# Patient Record
Sex: MALE | Race: WHITE | NOT HISPANIC OR LATINO | URBAN - METROPOLITAN AREA
[De-identification: names, ages, dates, MRNs, and addresses within clinical notes are randomized per-mention and may not be internally consistent; named-entity substitution may affect disease eponyms.]

---

## 2023-04-06 ENCOUNTER — TELEPHONE (OUTPATIENT)
Dept: PRIMARY CARE | Facility: CLINIC | Age: 83
End: 2023-04-06

## 2023-04-21 ENCOUNTER — TELEMEDICINE (OUTPATIENT)
Dept: PRIMARY CARE | Facility: CLINIC | Age: 83
End: 2023-04-21
Payer: MEDICARE

## 2023-04-21 DIAGNOSIS — C34.11: Primary | ICD-10-CM

## 2023-04-21 PROBLEM — H35.30 MACULAR DEGENERATION: Status: ACTIVE | Noted: 2023-04-21

## 2023-04-21 PROBLEM — I10 HTN (HYPERTENSION): Status: ACTIVE | Noted: 2023-04-21

## 2023-04-21 PROBLEM — I10 BENIGN ESSENTIAL HYPERTENSION: Status: ACTIVE | Noted: 2022-05-26

## 2023-04-21 PROBLEM — E78.5 HYPERLIPIDEMIA: Status: ACTIVE | Noted: 2022-05-26

## 2023-04-21 PROBLEM — R29.6 RECURRENT FALLS: Status: ACTIVE | Noted: 2023-04-21

## 2023-04-21 PROBLEM — I25.2 OLD MYOCARDIAL INFARCTION: Status: ACTIVE | Noted: 2022-05-26

## 2023-04-21 PROBLEM — N40.0 BPH (BENIGN PROSTATIC HYPERPLASIA): Status: ACTIVE | Noted: 2023-04-21

## 2023-04-21 PROBLEM — I25.10 ATHEROSCLEROTIC HEART DISEASE OF NATIVE CORONARY ARTERY WITHOUT ANGINA PECTORIS: Status: ACTIVE | Noted: 2022-05-26

## 2023-04-21 PROBLEM — I48.0 PAROXYSMAL ATRIAL FIBRILLATION (MULTI): Status: ACTIVE | Noted: 2022-05-26

## 2023-04-21 PROBLEM — E55.9 VITAMIN D DEFICIENCY: Status: ACTIVE | Noted: 2022-05-26

## 2023-04-21 PROBLEM — E11.9 TYPE 2 DIABETES MELLITUS (MULTI): Status: ACTIVE | Noted: 2022-05-26

## 2023-04-21 PROCEDURE — 99212 OFFICE O/P EST SF 10 MIN: CPT | Performed by: FAMILY MEDICINE

## 2023-04-21 RX ORDER — DONEPEZIL HYDROCHLORIDE 10 MG/1
10 TABLET, FILM COATED ORAL
COMMUNITY
Start: 2015-11-02

## 2023-04-21 RX ORDER — ASPIRIN 81 MG/1
81 TABLET ORAL
COMMUNITY
Start: 2023-04-13

## 2023-04-21 RX ORDER — SERTRALINE HYDROCHLORIDE 25 MG/1
TABLET, FILM COATED ORAL
COMMUNITY
Start: 2023-04-04

## 2023-04-21 RX ORDER — METFORMIN HYDROCHLORIDE 1000 MG/1
TABLET ORAL 2 TIMES DAILY
COMMUNITY
Start: 2014-07-30

## 2023-04-21 RX ORDER — ONDANSETRON 4 MG/1
TABLET, ORALLY DISINTEGRATING ORAL
COMMUNITY
Start: 2023-03-11

## 2023-04-21 RX ORDER — SIMVASTATIN 40 MG/1
40 TABLET, FILM COATED ORAL
COMMUNITY
Start: 2016-08-04

## 2023-04-21 RX ORDER — CALCIUM CARBONATE/VITAMIN D3 500-10/5ML
1 LIQUID (ML) ORAL DAILY
COMMUNITY

## 2023-04-21 RX ORDER — ERGOCALCIFEROL 1.25 MG/1
50000 CAPSULE ORAL
COMMUNITY
Start: 2023-04-19

## 2023-04-21 RX ORDER — POTASSIUM CHLORIDE 1500 MG/1
TABLET, EXTENDED RELEASE ORAL
COMMUNITY
Start: 2022-07-09

## 2023-04-21 RX ORDER — VITAMIN B COMPLEX
1 TABLET ORAL DAILY
COMMUNITY

## 2023-04-21 RX ORDER — CLOPIDOGREL BISULFATE 75 MG/1
75 TABLET, FILM COATED ORAL
COMMUNITY
Start: 2016-11-07

## 2023-04-21 RX ORDER — SERTRALINE HYDROCHLORIDE 50 MG/1
TABLET, FILM COATED ORAL
COMMUNITY
Start: 2023-04-04

## 2023-04-21 RX ORDER — ATORVASTATIN CALCIUM 20 MG/1
TABLET, FILM COATED ORAL
COMMUNITY
Start: 2023-04-04

## 2023-04-21 RX ORDER — ENALAPRIL MALEATE 5 MG
5 TABLET ORAL
COMMUNITY
Start: 2012-06-22

## 2023-04-21 RX ORDER — EPINEPHRINE 0.22MG
AEROSOL WITH ADAPTER (ML) INHALATION
COMMUNITY

## 2023-04-21 RX ORDER — BLOOD SUGAR DIAGNOSTIC
STRIP MISCELLANEOUS
COMMUNITY
Start: 2014-09-11

## 2023-04-21 RX ORDER — PROCHLORPERAZINE MALEATE 10 MG
TABLET ORAL
COMMUNITY
Start: 2022-11-14

## 2023-04-21 NOTE — PROGRESS NOTES
Subjective   Patient ID: Eric Yanes is a 82 y.o. male who presents for needs a placard.    Assessment/Plan     Problem List Items Addressed This Visit    None       Previous       lab work done in May reviewed  Hemoglobin A1c 5.4 on previous visit will check today  Colonoscopy done in March 2017 did not reveal any acute abnormalities. Colonic biopsies within normal limits. Can have a colonoscopy in 5-10 years.  Recent lab work reviewed no lab work today  DEXA scan - February 2018 within normal limits  Urology referral - was seen by Dr. Lamb for BPH and had a prostate laser surgery done  Patient and his wife received new shingles vaccine  Follow-up with cardiology and surgery for right inguinal hernia   Discussed about diet exercise  Up-to-date with pneumonia vaccine  Up-to-date with Covid vaccine        Follow-up in 6 months  Zostavax obtained 2014  Patient understands and in agreement with plan.    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 following with hematology oncology  Diabetes   coronary artery disease status post 3 stent placement   hypertension   hyperlipidemia   right inguinal hernia status post repair   BPH following up with urology   osteoarthritis status post right total hip replacement  Chronic right shoulder pain with history of fall physical therapy was recommended  Chronic constipation             right upper lobe mass s/p biopsy done on 8/23/2022 showed invasive well-differentiated adenocarcinoma -s/p right upper lobe lobectomy       Taking medications as prescribed  No new signs and symptoms  Received flu vaccine received Covid booster vaccin    No Known Allergies    Current Outpatient Medications    Medication Sig Dispense Refill    aspirin 81 mg EC tablet 1 tablet (81 mg).      atorvastatin (Lipitor) 20 mg tablet       calcium carb/D3/magnesium/zinc (calcium carb-D3-mag cmb11-zinc) [phone] mg-unit-mg-mg tablet Take 1 tablet by mouth once daily.      coenzyme Q-10 100 mg capsule Take by mouth once daily.      donepezil (Aricept) 10 mg tablet 1 tablet (10 mg).      ergocalciferol (Vitamin D-2) 1.25 MG (97164 UT) capsule 50,000 Int'l Units.      magnesium oxide 400 mg magnesium capsule Take 1 capsule (400 mg) by mouth once daily.      metFORMIN (Glucophage) 1,000 mg tablet Take by mouth twice a day.      ondansetron ODT (Zofran-ODT) 4 mg disintegrating tablet       OneTouch Ultra Test strip once daily.      Plavix 75 mg tablet 1 tablet (75 mg).      potassium chloride CR (K-Tab) 20 mEq ER tablet TAKE ONE TABLET BY MOUTH DAILY IN THE MORNING FOR SUPPLEMENT      prochlorperazine (Compazine) 10 mg tablet TAKE ONE TABLET BY MOUTH EVERY SIX HOURS AS NEEDED FOR NAUSEA/vomiting      sertraline (Zoloft) 25 mg tablet       Vasotec 5 mg tablet 1 tablet (5 mg).      sertraline (Zoloft) 50 mg tablet       simvastatin (Zocor) 40 mg tablet 1 tablet (40 mg).       No current facility-administered medications for this visit.       Objective   Visit Vitals  Smoking Status Never     Physical Exam  Constitutional:       General: He is not in acute distress.     Appearance: Normal appearance.   HENT:      Head: Normocephalic and atraumatic.      Nose: Nose normal.   Eyes:      Extraocular Movements: Extraocular movements intact.      Conjunctiva/sclera: Conjunctivae normal.   Cardiovascular:      Rate and Rhythm: Normal rate and regular rhythm.   Pulmonary:      Effort: Pulmonary effort is normal.      Breath sounds: Normal breath sounds.   Skin:     General: Skin is warm.   Neurological:      Mental Status: He is alert and oriented to person, place, and time.   Psychiatric:         Mood and Affect: Mood normal.          Behavior: Behavior normal.   Immunization History   Administered Date(s) Administered    Pfizer Purple Cap SARS-CoV-2 03/22/2021, 04/12/2021, 11/15/2021       Review of Systems    No visits with results within 4 Month(s) from this visit.   Latest known visit with results is:   Legacy Encounter on 11/09/2022   Component Date Value Ref Range Status    Color, Urine 11/09/2022 YELLOW  STRAW,YELLOW Final    Appearance, Urine 11/09/2022 CLEAR  CLEAR Final    Specific Gravity, Urine 11/09/2022 1.016  1.005 - 1.035 Final    pH, Urine 11/09/2022 5.0  5.0 - 8.0 Final    Protein, Urine 11/09/2022 NEGATIVE  NEGATIVE mg/dL Final    Glucose, Urine 11/09/2022 NEGATIVE  NEGATIVE mg/dL Final    Blood, Urine 11/09/2022 NEGATIVE  NEGATIVE Final    Ketones, Urine 11/09/2022 NEGATIVE  NEGATIVE mg/dL Final    Bilirubin, Urine 11/09/2022 NEGATIVE  NEGATIVE Final    Urobilinogen, Urine 11/09/2022 <2.0  0.0 - 1.9 mg/dL Final    Nitrite, Urine 11/09/2022 NEGATIVE  NEGATIVE Final    Leukocyte Esterase, Urine 11/09/2022 NEGATIVE  NEGATIVE Final       Radiology: Reviewed imaging in powerchart.  No results found.    No family history on file.  Social History     Socioeconomic History    Marital status:      Spouse name: None    Number of children: None    Years of education: None    Highest education level: None   Occupational History    None   Tobacco Use    Smoking status: Never    Smokeless tobacco: Never   Vaping Use    Vaping status: None   Substance and Sexual Activity    Alcohol use: Never    Drug use: Never    Sexual activity: None   Other Topics Concern    None   Social History Narrative    None     Social Determinants of Health     Financial Resource Strain: Not on file   Food Insecurity: Not on file   Transportation Needs: Not on file   Physical Activity: Not on file   Stress: Not on file   Social Connections: Not on file   Intimate Partner Violence: Not on file   Housing Stability: Not on file     Past Medical History:    Diagnosis Date    Cervicalgia     Cervical pain    Encounter for screening for malignant neoplasm of colon     Screening for colorectal cancer    Encounter for screening for malignant neoplasm of prostate     Prostate cancer screening encounter, options and risks discussed    Nocturia     Nocturia    Paresthesia of skin     Paresthesias    Personal history of other diseases of male genital organs     History of erectile dysfunction    Personal history of other infectious and parasitic diseases     History of herpes zoster    Personal history of other specified conditions     History of insomnia    Sciatica, unspecified side 12/08/2013    Sciatica    Unspecified astigmatism, unspecified eye     Astigmatism    Unspecified cataract     Early cataracts, bilateral    Ventral hernia without obstruction or gangrene     Ventral hernia     Past Surgical History:   Procedure Laterality Date    BACK SURGERY  09/13/2014    Back Surgery    CERVICAL LAMINECTOMY  02/17/2014    Laminectomy Cervical    EYE SURGERY  02/17/2014    Eye Surgery    HERNIA REPAIR  09/13/2014    Inguinal Hernia Repair    OTHER SURGICAL HISTORY  02/17/2014    Reported Hx Of Hip Replacement - Right Side    TONSILLECTOMY  02/17/2014    Tonsillectomy With Adenoidectomy       Charting was completed using voice recognition technology and may include unintended errors.

## 2023-07-28 ENCOUNTER — HOSPITAL ENCOUNTER (OUTPATIENT)
Dept: DATA CONVERSION | Facility: HOSPITAL | Age: 83
End: 2023-07-28
Attending: INTERNAL MEDICINE | Admitting: INTERNAL MEDICINE
Payer: MEDICARE

## 2023-07-28 DIAGNOSIS — C78.1 SECONDARY MALIGNANT NEOPLASM OF MEDIASTINUM (MULTI): ICD-10-CM

## 2023-07-28 DIAGNOSIS — R59.0 LOCALIZED ENLARGED LYMPH NODES: ICD-10-CM

## 2023-07-28 DIAGNOSIS — I10 ESSENTIAL (PRIMARY) HYPERTENSION: ICD-10-CM

## 2023-07-28 DIAGNOSIS — R91.8 OTHER NONSPECIFIC ABNORMAL FINDING OF LUNG FIELD: ICD-10-CM

## 2023-07-28 DIAGNOSIS — E78.5 HYPERLIPIDEMIA, UNSPECIFIED: ICD-10-CM

## 2023-07-28 DIAGNOSIS — Z85.118 PERSONAL HISTORY OF OTHER MALIGNANT NEOPLASM OF BRONCHUS AND LUNG: ICD-10-CM

## 2023-07-28 DIAGNOSIS — R09.89 OTHER SPECIFIED SYMPTOMS AND SIGNS INVOLVING THE CIRCULATORY AND RESPIRATORY SYSTEMS: ICD-10-CM

## 2023-07-28 DIAGNOSIS — I25.10 ATHEROSCLEROTIC HEART DISEASE OF NATIVE CORONARY ARTERY WITHOUT ANGINA PECTORIS: ICD-10-CM

## 2023-07-28 LAB — POCT GLUCOSE: 111 MG/DL (ref 74–99)

## 2023-07-30 LAB
GRAM STAIN: NORMAL
RESPIRATORY CULTURE/SMEAR: NORMAL

## 2023-08-01 LAB
COMPLETE PATHOLOGY REPORT: NORMAL
CONVERTED CLINICAL DIAGNOSIS-HISTORY: NORMAL
CONVERTED FINAL DIAGNOSIS: NORMAL
CONVERTED FINAL REPORT PDF LINK TO COPY AND PASTE: NORMAL
CONVERTED GROSS DESCRIPTION: NORMAL

## 2023-08-02 LAB
COMPLETE PATHOLOGY REPORT: NORMAL
CONVERTED CLINICAL DIAGNOSIS-HISTORY: NORMAL
CONVERTED DIAGNOSIS COMMENT: NORMAL
CONVERTED FINAL DIAGNOSIS: NORMAL
CONVERTED FINAL REPORT PDF LINK TO COPY AND PASTE: NORMAL
CONVERTED RAPID EVALUATION: NORMAL
CONVERTED SPECIMEN DESCRIPTION: NORMAL

## 2023-08-08 LAB
FUNGAL CULTURE/SMEAR: ABNORMAL
FUNGAL CULTURE/SMEAR: ABNORMAL
FUNGAL SMEAR: ABNORMAL

## 2023-09-19 LAB
AFB CULTURE: NORMAL
AFB STAIN: NORMAL

## 2023-09-29 VITALS — HEIGHT: 63 IN | BODY MASS INDEX: 24.61 KG/M2 | WEIGHT: 138.89 LBS

## 2024-09-05 ENCOUNTER — TELEPHONE (OUTPATIENT)
Dept: PRIMARY CARE | Facility: CLINIC | Age: 84
End: 2024-09-05
Payer: MEDICARE